# Patient Record
Sex: FEMALE | Race: WHITE | URBAN - NONMETROPOLITAN AREA
[De-identification: names, ages, dates, MRNs, and addresses within clinical notes are randomized per-mention and may not be internally consistent; named-entity substitution may affect disease eponyms.]

---

## 2019-07-16 ENCOUNTER — NEW PATIENT (OUTPATIENT)
Dept: URBAN - NONMETROPOLITAN AREA CLINIC 12 | Facility: CLINIC | Age: 64
End: 2019-07-16
Payer: COMMERCIAL

## 2019-07-16 DIAGNOSIS — H52.03 HYPERMETROPIA, BILATERAL: Primary | ICD-10-CM

## 2019-07-16 PROCEDURE — 92004 COMPRE OPH EXAM NEW PT 1/>: CPT | Performed by: OPTOMETRIST

## 2019-07-16 PROCEDURE — 92015 DETERMINE REFRACTIVE STATE: CPT | Performed by: OPTOMETRIST

## 2019-07-16 ASSESSMENT — VISUAL ACUITY
OD: 20/25
OS: 20/150

## 2019-07-16 ASSESSMENT — INTRAOCULAR PRESSURE
OS: 16
OD: 19

## 2020-04-16 ENCOUNTER — FOLLOW UP ESTABLISHED (OUTPATIENT)
Dept: URBAN - NONMETROPOLITAN AREA CLINIC 13 | Facility: CLINIC | Age: 65
End: 2020-04-16
Payer: COMMERCIAL

## 2020-04-16 DIAGNOSIS — H53.19 OTHER SUBJECTIVE VISUAL DISTURBANCES: Primary | ICD-10-CM

## 2020-04-16 PROCEDURE — 92014 COMPRE OPH EXAM EST PT 1/>: CPT | Performed by: OPTOMETRIST

## 2020-04-16 ASSESSMENT — INTRAOCULAR PRESSURE
OD: 10
OS: 10

## 2021-07-28 ENCOUNTER — OFFICE VISIT (OUTPATIENT)
Dept: URBAN - NONMETROPOLITAN AREA CLINIC 12 | Facility: CLINIC | Age: 66
End: 2021-07-28
Payer: COMMERCIAL

## 2021-07-28 DIAGNOSIS — H16.223 KERATOCONJUNCTIVITIS SICCA, BILATERAL: ICD-10-CM

## 2021-07-28 DIAGNOSIS — H52.223 REGULAR ASTIGMATISM, BILATERAL: Primary | ICD-10-CM

## 2021-07-28 PROCEDURE — 92014 COMPRE OPH EXAM EST PT 1/>: CPT | Performed by: OPTOMETRIST

## 2021-07-28 ASSESSMENT — INTRAOCULAR PRESSURE
OS: 15
OD: 16

## 2021-07-28 NOTE — IMPRESSION/PLAN
Impression: Keratoconjunctivitis sicca, bilateral: H93.225. Plan: Dry eyes discussed with patient. Suggested use of ATs 1 drop QID, more if needed. Also discussed use of humidifier. If relief not found with ATs RTC for further evaluation.